# Patient Record
Sex: FEMALE | Race: WHITE | URBAN - METROPOLITAN AREA
[De-identification: names, ages, dates, MRNs, and addresses within clinical notes are randomized per-mention and may not be internally consistent; named-entity substitution may affect disease eponyms.]

---

## 2020-11-02 ENCOUNTER — OUT OF OFFICE VISIT (OUTPATIENT)
Dept: URBAN - METROPOLITAN AREA MEDICAL CENTER 33 | Facility: MEDICAL CENTER | Age: 35
End: 2020-11-02
Payer: COMMERCIAL

## 2020-11-02 DIAGNOSIS — R79.89 ABNORMAL C-REACTIVE PROTEIN: ICD-10-CM

## 2020-11-02 DIAGNOSIS — Z12.11 COLON CANCER SCREENING: ICD-10-CM

## 2020-11-02 DIAGNOSIS — R76.0 ABNORMAL ANTIBODY TITER: ICD-10-CM

## 2020-11-02 DIAGNOSIS — K59.09 CHRONIC CONSTIPATION: ICD-10-CM

## 2020-11-02 PROCEDURE — 99232 SBSQ HOSP IP/OBS MODERATE 35: CPT | Performed by: INTERNAL MEDICINE

## 2020-11-02 PROCEDURE — 99254 IP/OBS CNSLTJ NEW/EST MOD 60: CPT | Performed by: INTERNAL MEDICINE

## 2020-11-02 PROCEDURE — 992 NON-BILLABLE: Performed by: PHYSICIAN ASSISTANT

## 2024-12-02 ENCOUNTER — TELEPHONE ENCOUNTER (OUTPATIENT)
Dept: URBAN - NONMETROPOLITAN AREA CLINIC 2 | Facility: CLINIC | Age: 39
End: 2024-12-02

## 2024-12-03 ENCOUNTER — LAB OUTSIDE AN ENCOUNTER (OUTPATIENT)
Dept: URBAN - NONMETROPOLITAN AREA CLINIC 2 | Facility: CLINIC | Age: 39
End: 2024-12-03

## 2024-12-03 ENCOUNTER — OFFICE VISIT (OUTPATIENT)
Dept: URBAN - NONMETROPOLITAN AREA CLINIC 2 | Facility: CLINIC | Age: 39
End: 2024-12-03
Payer: SELF-PAY

## 2024-12-03 ENCOUNTER — DASHBOARD ENCOUNTERS (OUTPATIENT)
Age: 39
End: 2024-12-03

## 2024-12-03 VITALS
WEIGHT: 240 LBS | HEIGHT: 68 IN | HEART RATE: 123 BPM | BODY MASS INDEX: 36.37 KG/M2 | SYSTOLIC BLOOD PRESSURE: 151 MMHG | DIASTOLIC BLOOD PRESSURE: 107 MMHG

## 2024-12-03 DIAGNOSIS — R16.1 SPLENOMEGALY: ICD-10-CM

## 2024-12-03 DIAGNOSIS — K59.00 CONSTIPATION, UNSPECIFIED CONSTIPATION TYPE: ICD-10-CM

## 2024-12-03 DIAGNOSIS — K76.0 HEPATIC STEATOSIS: ICD-10-CM

## 2024-12-03 DIAGNOSIS — K62.5 RECTAL BLEEDING: ICD-10-CM

## 2024-12-03 DIAGNOSIS — R10.84 GENERALIZED ABDOMINAL PAIN: ICD-10-CM

## 2024-12-03 DIAGNOSIS — R74.8 ELEVATED LIVER ENZYMES: ICD-10-CM

## 2024-12-03 PROBLEM — 14760008: Status: ACTIVE | Noted: 2024-12-03

## 2024-12-03 PROBLEM — 197321007: Status: ACTIVE | Noted: 2024-12-03

## 2024-12-03 PROBLEM — 12063002: Status: ACTIVE | Noted: 2024-12-03

## 2024-12-03 PROBLEM — 102614006: Status: ACTIVE | Noted: 2024-12-03

## 2024-12-03 PROBLEM — 16294009: Status: ACTIVE | Noted: 2024-12-03

## 2024-12-03 PROCEDURE — 99214 OFFICE O/P EST MOD 30 MIN: CPT

## 2024-12-03 RX ORDER — LACTULOSE 10 G/15ML
15 ML AS NEEDED SOLUTION ORAL ONCE A DAY
Qty: 450 | OUTPATIENT
Start: 2024-12-03 | End: 2025-01-02

## 2024-12-03 RX ORDER — TRAMADOL HYDROCHLORIDE 50 MG/1
TABLET, FILM COATED ORAL
Qty: 20 TABLET | Status: ACTIVE | COMMUNITY

## 2024-12-03 RX ORDER — HYDROCODONE BITARTRATE AND ACETAMINOPHEN 10; 325 MG/1; MG/1
TABLET ORAL
Qty: 20 TABLET | Status: ACTIVE | COMMUNITY

## 2024-12-03 RX ORDER — LAMOTRIGINE 100 MG/1
TABLET ORAL
Qty: 30 TABLET | Status: ACTIVE | COMMUNITY

## 2024-12-03 RX ORDER — CLOTRIMAZOLE AND BETAMETHASONE DIPROPIONATE 10; .5 MG/G; MG/G
MIX WITH ZINC AND APPLY TWICE DAILY UNTIL GONE CREAM TOPICAL
Qty: 45 GRAM | Refills: 0 | Status: ACTIVE | COMMUNITY

## 2024-12-03 NOTE — HPI-TODAY'S VISIT:
12/3/2024 Lulu returns to clinic for follow-up after hospital visit last night for rectal bleeding and abdominal pain. Her CT scan was completed and showed no sign of GI hemorrhage or bowel wall inflammation.  She was discharged to follow-up with her clinic. Today patient is quite tearful in and discomfort on sitting.  She feels better if she stands.  She states she has suffered with this chronic abdominal pain and occasional rectal bleeding with no improvement.  Previous evaluation with [facility] last year included order for Prometheus testing and CTE which were not completed.  At that time she was suspected to have Crohn's disease with increase suspicion secondary to her pyoderma gangrenosum. Patient reports weight gain and increased abdominal distention.  She states she has regular bowel movements but they are after meals and mucoid. She denies dysphagia heartburn or reflux. She reports continued episodes of blood per rectum with rectal pain she feels deep which is random and shoots up her rectum. Her past colonoscopy with another provider in 2023 was incomplete due to poor prep.  Today we discussed rescheduling repeat colonoscopy for full colon and rectal evaluation with constipation prep prior.  Although patient denies constipation due to several bowel movements daily.  She does have CT imaging last month showing a moderate stool burden. Multiple CTs showed ovarian cyst. She has a history of elevated liver enzymes and hepatic steatosis, hepatomegaly and splenomegaly noted on imaging.  She will complete the chronic liver serologies today and will schedule a right upper quadrant ultrasound.  She will start lactulose for constipation as she is self-pay with no insurance coverage today. She would like to have her procedure done as soon as possible.  She is quite emotional today.  She was given antianxiety and pain meds upon discharge at the emergency department but she states these medicines do not help her. She has multiple rashes on her body including her umbilicus.  She does follow with Dermatology regularly. She denies alcohol intake or history of known liver disease. SP

## 2024-12-03 NOTE — HPI-TODAY'S VISIT:
10/12/2023 Ms. Lydia Balderrama is a 38 year old female here for pyoderma gangrenosum. She has been dealing with recurrent skin nodules and sores. She had a bx earlier this week and found to have pyoderma gangrenosum. 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 She has been in and out of the hosptial. She had an EGD and colonoscopy in Feb 2023. This showed candida esophagitis, gastritis, and hemorrhoids. There were no ulcers or signs of IBD. She had a CT 1/2023 with normal SB. She denies any family hx of IBD. She random abdominal pain. She has normal BM with ocassional mucous. CS

## 2024-12-10 ENCOUNTER — OFFICE VISIT (OUTPATIENT)
Dept: URBAN - NONMETROPOLITAN AREA SURGERY CENTER 1 | Facility: SURGERY CENTER | Age: 39
End: 2024-12-10

## 2024-12-20 ENCOUNTER — TELEPHONE ENCOUNTER (OUTPATIENT)
Dept: URBAN - NONMETROPOLITAN AREA CLINIC 2 | Facility: CLINIC | Age: 39
End: 2024-12-20

## 2025-01-03 ENCOUNTER — OFFICE VISIT (OUTPATIENT)
Dept: URBAN - METROPOLITAN AREA TELEHEALTH 2 | Facility: TELEHEALTH | Age: 40
End: 2025-01-03

## 2025-01-03 ENCOUNTER — TELEPHONE ENCOUNTER (OUTPATIENT)
Dept: URBAN - NONMETROPOLITAN AREA CLINIC 2 | Facility: CLINIC | Age: 40
End: 2025-01-03

## 2025-01-03 ENCOUNTER — LAB OUTSIDE AN ENCOUNTER (OUTPATIENT)
Dept: URBAN - METROPOLITAN AREA TELEHEALTH 2 | Facility: TELEHEALTH | Age: 40
End: 2025-01-03

## 2025-01-03 ENCOUNTER — OFFICE VISIT (OUTPATIENT)
Dept: URBAN - METROPOLITAN AREA TELEHEALTH 2 | Facility: TELEHEALTH | Age: 40
End: 2025-01-03
Payer: SELF-PAY

## 2025-01-03 VITALS — HEIGHT: 68 IN

## 2025-01-03 DIAGNOSIS — R16.1 SPLENOMEGALY: ICD-10-CM

## 2025-01-03 DIAGNOSIS — R74.8 ELEVATED LIVER ENZYMES: ICD-10-CM

## 2025-01-03 DIAGNOSIS — L88 PYODERMA GANGRENOSUM: ICD-10-CM

## 2025-01-03 DIAGNOSIS — K62.5 RECTAL BLEEDING: ICD-10-CM

## 2025-01-03 DIAGNOSIS — K76.0 HEPATIC STEATOSIS: ICD-10-CM

## 2025-01-03 DIAGNOSIS — K59.00 CONSTIPATION, UNSPECIFIED CONSTIPATION TYPE: ICD-10-CM

## 2025-01-03 DIAGNOSIS — R10.84 GENERALIZED ABDOMINAL PAIN: ICD-10-CM

## 2025-01-03 PROCEDURE — 99214 OFFICE O/P EST MOD 30 MIN: CPT

## 2025-01-03 RX ORDER — LAMOTRIGINE 100 MG/1
TABLET ORAL
Qty: 30 TABLET | Status: ACTIVE | COMMUNITY

## 2025-01-03 RX ORDER — TRAMADOL HYDROCHLORIDE 50 MG/1
TABLET, FILM COATED ORAL
Qty: 20 TABLET | Status: ACTIVE | COMMUNITY

## 2025-01-03 RX ORDER — CLOTRIMAZOLE AND BETAMETHASONE DIPROPIONATE 10; .5 MG/G; MG/G
MIX WITH ZINC AND APPLY TWICE DAILY UNTIL GONE CREAM TOPICAL
Qty: 45 GRAM | Refills: 0 | Status: ACTIVE | COMMUNITY

## 2025-01-03 RX ORDER — HYDROCORTISONE ACETATE 25 MG/1
1 SUPPOSITORY SUPPOSITORY RECTAL ONCE A DAY
Qty: 14 SUPPOSITORY | Refills: 2 | OUTPATIENT
Start: 2025-01-03 | End: 2025-02-14

## 2025-01-03 RX ORDER — DICYCLOMINE HYDROCHLORIDE 20 MG/2ML
1 ML INJECTION, SOLUTION INTRAMUSCULAR
Qty: 8 | OUTPATIENT
Start: 2025-01-03 | End: 2025-01-05

## 2025-01-03 RX ORDER — HYDROCODONE BITARTRATE AND ACETAMINOPHEN 10; 325 MG/1; MG/1
TABLET ORAL
Qty: 20 TABLET | Status: ACTIVE | COMMUNITY

## 2025-01-03 NOTE — HPI-TODAY'S VISIT:
12/3/2024 Lulu returns to clinic for follow-up after hospital visit last night for rectal bleeding and abdominal pain. Her CT scan was completed and showed no sign of GI hemorrhage or bowel wall inflammation.  She was discharged to follow-up with her clinic. Today patient is quite tearful in and discomfort on sitting.  She feels better if she stands.  She states she has suffered with this chronic abdominal pain and occasional rectal bleeding with no improvement.  Previous evaluation with [facility] last year included order for Prometheus testing and CTE which were not completed.  At that time she was suspected to have Crohn's disease with increase suspicion secondary to her pyoderma gangrenosum. Patient reports weight gain and increased abdominal distention.  She states she has regular bowel movements but they are after meals and mucoid. She denies dysphagia heartburn or reflux. She reports continued episodes of blood per rectum with rectal pain she feels deep which is random and shoots up her rectum. Her past colonoscopy with another provider in 2023 was incomplete due to poor prep.  Today we discussed rescheduling repeat colonoscopy for full colon and rectal evaluation with constipation prep prior.  Although patient denies constipation due to several bowel movements daily.  She does have CT imaging last month showing a moderate stool burden. Multiple CTs showed ovarian cyst. She has a history of elevated liver enzymes and hepatic steatosis, hepatomegaly and splenomegaly noted on imaging.  She will complete the chronic liver serologies today and will schedule a right upper quadrant ultrasound.  She will start lactulose for constipation as she is self-pay with no insurance coverage today. She would like to have her procedure done as soon as possible.  She is quite emotional today.  She was given antianxiety and pain meds upon discharge at the emergency department but she states these medicines do not help her. She has multiple rashes on her body including her umbilicus.  She does follow with Dermatology regularly. She denies alcohol intake or history of known liver disease. SP 1/3/2025 Ms. Balderrama returns via telehealth telephone visit to discuss her ongoing GI symptoms.  She states nothing is changed since the last office visit.  She continues with abdominal pain loose stool.  She has abdominal cramping pain bloody stools.  All of her stools are liquid and never formed.  Since her last office visit she did not complete lab work or imaging with concerns for lack of insurance.  She states she has obtained this and would like to complete this now.  She had a significant flare of her pyoderma gangrenosum and was hospitalized in early December.  She was seen by infectious disease treated with steroids and antibiotics.  She was recommended to see rheumatology and dermatology with urgent hospital follow-up.  Patient has not seen either of these specialties yet.  He states she continues with facial swelling.  She does not feel that her Pal[place] derma gangrenosum is under control.  In the past she has been fired infusion therapy.  She agrees to schedule these today. For now she will keep her appointment for her upcoming colonoscopy and agrees to a CTE. She will trial dicyclomine for abdominal cramping pain.  Her CTs while in the hospital did not note any constipation.  Will also give her prescription for steroid suppositories.  She has no new GI complaints today. ANGEL

## 2025-02-05 ENCOUNTER — OFFICE VISIT (OUTPATIENT)
Dept: URBAN - NONMETROPOLITAN AREA SURGERY CENTER 1 | Facility: SURGERY CENTER | Age: 40
End: 2025-02-05

## 2025-02-06 ENCOUNTER — OFFICE VISIT (OUTPATIENT)
Dept: URBAN - NONMETROPOLITAN AREA SURGERY CENTER 1 | Facility: SURGERY CENTER | Age: 40
End: 2025-02-06

## 2025-03-20 ENCOUNTER — TELEPHONE ENCOUNTER (OUTPATIENT)
Dept: URBAN - NONMETROPOLITAN AREA CLINIC 2 | Facility: CLINIC | Age: 40
End: 2025-03-20

## 2025-03-24 ENCOUNTER — OFFICE VISIT (OUTPATIENT)
Dept: URBAN - METROPOLITAN AREA TELEHEALTH 2 | Facility: TELEHEALTH | Age: 40
End: 2025-03-24

## 2025-03-24 RX ORDER — LAMOTRIGINE 100 MG/1
TABLET ORAL
Qty: 30 TABLET | Status: ACTIVE | COMMUNITY

## 2025-03-24 RX ORDER — CLOTRIMAZOLE AND BETAMETHASONE DIPROPIONATE 10; .5 MG/G; MG/G
MIX WITH ZINC AND APPLY TWICE DAILY UNTIL GONE CREAM TOPICAL
Qty: 45 GRAM | Refills: 0 | Status: ACTIVE | COMMUNITY

## 2025-03-24 RX ORDER — TRAMADOL HYDROCHLORIDE 50 MG/1
TABLET, FILM COATED ORAL
Qty: 20 TABLET | Status: ACTIVE | COMMUNITY

## 2025-03-24 RX ORDER — HYDROCODONE BITARTRATE AND ACETAMINOPHEN 10; 325 MG/1; MG/1
TABLET ORAL
Qty: 20 TABLET | Status: ACTIVE | COMMUNITY

## 2025-03-26 ENCOUNTER — OFFICE VISIT (OUTPATIENT)
Dept: URBAN - METROPOLITAN AREA TELEHEALTH 2 | Facility: TELEHEALTH | Age: 40
End: 2025-03-26

## 2025-03-26 RX ORDER — HYDROCODONE BITARTRATE AND ACETAMINOPHEN 10; 325 MG/1; MG/1
TABLET ORAL
Qty: 20 TABLET | Status: ACTIVE | COMMUNITY

## 2025-03-26 RX ORDER — LAMOTRIGINE 100 MG/1
TABLET ORAL
Qty: 30 TABLET | Status: ACTIVE | COMMUNITY

## 2025-03-26 RX ORDER — CLOTRIMAZOLE AND BETAMETHASONE DIPROPIONATE 10; .5 MG/G; MG/G
MIX WITH ZINC AND APPLY TWICE DAILY UNTIL GONE CREAM TOPICAL
Qty: 45 GRAM | Refills: 0 | Status: ACTIVE | COMMUNITY

## 2025-03-26 RX ORDER — TRAMADOL HYDROCHLORIDE 50 MG/1
TABLET, FILM COATED ORAL
Qty: 20 TABLET | Status: ACTIVE | COMMUNITY

## 2025-03-27 ENCOUNTER — TELEPHONE ENCOUNTER (OUTPATIENT)
Dept: URBAN - NONMETROPOLITAN AREA CLINIC 2 | Facility: CLINIC | Age: 40
End: 2025-03-27

## 2025-03-27 ENCOUNTER — CLAIMS CREATED FROM THE CLAIM WINDOW (OUTPATIENT)
Dept: URBAN - METROPOLITAN AREA CLINIC 4 | Facility: CLINIC | Age: 40
End: 2025-03-27
Payer: COMMERCIAL

## 2025-03-27 ENCOUNTER — OFFICE VISIT (OUTPATIENT)
Dept: URBAN - NONMETROPOLITAN AREA SURGERY CENTER 1 | Facility: SURGERY CENTER | Age: 40
End: 2025-03-27

## 2025-03-27 DIAGNOSIS — K31.89 OTHER DISEASES OF STOMACH AND DUODENUM: ICD-10-CM

## 2025-03-27 DIAGNOSIS — D12.2 BENIGN NEOPLASM OF ASCENDING COLON: ICD-10-CM

## 2025-03-27 PROBLEM — 235595009: Status: ACTIVE | Noted: 2025-03-27

## 2025-03-27 PROCEDURE — 88305 TISSUE EXAM BY PATHOLOGIST: CPT | Performed by: PATHOLOGY

## 2025-03-27 RX ORDER — HYDROCORTISONE 25 MG/G
1 APPLICATION CREAM TOPICAL TWICE A DAY
Qty: 28 | Refills: 3 | OUTPATIENT
Start: 2025-03-27 | End: 2025-05-22

## 2025-03-27 RX ORDER — CLOTRIMAZOLE AND BETAMETHASONE DIPROPIONATE 10; .5 MG/G; MG/G
MIX WITH ZINC AND APPLY TWICE DAILY UNTIL GONE CREAM TOPICAL
Qty: 45 GRAM | Refills: 0 | Status: ACTIVE | COMMUNITY

## 2025-03-27 RX ORDER — OMEPRAZOLE 40 MG/1
1 CAPSULE 30 MINUTES BEFORE MORNING MEAL CAPSULE, DELAYED RELEASE ORAL ONCE A DAY
Qty: 90 | Refills: 3 | OUTPATIENT
Start: 2025-03-27

## 2025-03-27 RX ORDER — LAMOTRIGINE 100 MG/1
TABLET ORAL
Qty: 30 TABLET | Status: ACTIVE | COMMUNITY

## 2025-03-27 RX ORDER — TRAMADOL HYDROCHLORIDE 50 MG/1
TABLET, FILM COATED ORAL
Qty: 20 TABLET | Status: ACTIVE | COMMUNITY

## 2025-03-27 RX ORDER — HYDROCODONE BITARTRATE AND ACETAMINOPHEN 10; 325 MG/1; MG/1
TABLET ORAL
Qty: 20 TABLET | Status: ACTIVE | COMMUNITY

## 2025-03-27 RX ORDER — FAMOTIDINE 40 MG/1
1 TABLET AT BEDTIME TABLET, FILM COATED ORAL ONCE A DAY
Qty: 90 TABLET | Refills: 3 | OUTPATIENT
Start: 2025-03-27

## 2025-03-27 NOTE — HPI-TODAY'S VISIT:
Start omeprazole 40 mg daily and famotidine 40 mg nightly for severe reflux symptoms.  Start hydrocortisone 2.5% ointment to treat hemorrhoids noted on colonoscopy.

## 2025-04-02 ENCOUNTER — TELEPHONE ENCOUNTER (OUTPATIENT)
Dept: URBAN - NONMETROPOLITAN AREA CLINIC 2 | Facility: CLINIC | Age: 40
End: 2025-04-02

## 2025-05-07 ENCOUNTER — OFFICE VISIT (OUTPATIENT)
Dept: URBAN - NONMETROPOLITAN AREA SURGERY CENTER 1 | Facility: SURGERY CENTER | Age: 40
End: 2025-05-07

## 2025-05-22 ENCOUNTER — OFFICE VISIT (OUTPATIENT)
Dept: URBAN - NONMETROPOLITAN AREA CLINIC 2 | Facility: CLINIC | Age: 40
End: 2025-05-22